# Patient Record
Sex: MALE | Race: WHITE
[De-identification: names, ages, dates, MRNs, and addresses within clinical notes are randomized per-mention and may not be internally consistent; named-entity substitution may affect disease eponyms.]

---

## 2017-12-12 ENCOUNTER — HOSPITAL ENCOUNTER (INPATIENT)
Dept: HOSPITAL 35 - ER | Age: 62
LOS: 1 days | Discharge: HOME | DRG: 69 | End: 2017-12-13
Attending: FAMILY MEDICINE | Admitting: FAMILY MEDICINE
Payer: COMMERCIAL

## 2017-12-12 VITALS — HEIGHT: 72.01 IN | BODY MASS INDEX: 26.9 KG/M2 | WEIGHT: 198.6 LBS

## 2017-12-12 VITALS — SYSTOLIC BLOOD PRESSURE: 172 MMHG | DIASTOLIC BLOOD PRESSURE: 82 MMHG

## 2017-12-12 VITALS — SYSTOLIC BLOOD PRESSURE: 168 MMHG | DIASTOLIC BLOOD PRESSURE: 98 MMHG

## 2017-12-12 VITALS — SYSTOLIC BLOOD PRESSURE: 144 MMHG | DIASTOLIC BLOOD PRESSURE: 77 MMHG

## 2017-12-12 VITALS — DIASTOLIC BLOOD PRESSURE: 85 MMHG | SYSTOLIC BLOOD PRESSURE: 139 MMHG

## 2017-12-12 VITALS — DIASTOLIC BLOOD PRESSURE: 74 MMHG | SYSTOLIC BLOOD PRESSURE: 143 MMHG

## 2017-12-12 VITALS — DIASTOLIC BLOOD PRESSURE: 78 MMHG | SYSTOLIC BLOOD PRESSURE: 135 MMHG

## 2017-12-12 DIAGNOSIS — Z91.040: ICD-10-CM

## 2017-12-12 DIAGNOSIS — Z79.899: ICD-10-CM

## 2017-12-12 DIAGNOSIS — G45.9: Primary | ICD-10-CM

## 2017-12-12 DIAGNOSIS — Z95.820: ICD-10-CM

## 2017-12-12 DIAGNOSIS — Z95.5: ICD-10-CM

## 2017-12-12 DIAGNOSIS — R07.9: ICD-10-CM

## 2017-12-12 DIAGNOSIS — I10: ICD-10-CM

## 2017-12-12 DIAGNOSIS — M47.892: ICD-10-CM

## 2017-12-12 DIAGNOSIS — I25.10: ICD-10-CM

## 2017-12-12 DIAGNOSIS — D72.829: ICD-10-CM

## 2017-12-12 DIAGNOSIS — I73.9: ICD-10-CM

## 2017-12-12 DIAGNOSIS — F41.0: ICD-10-CM

## 2017-12-12 DIAGNOSIS — E78.5: ICD-10-CM

## 2017-12-12 DIAGNOSIS — R20.2: ICD-10-CM

## 2017-12-12 DIAGNOSIS — F10.10: ICD-10-CM

## 2017-12-12 DIAGNOSIS — H40.9: ICD-10-CM

## 2017-12-12 DIAGNOSIS — F17.210: ICD-10-CM

## 2017-12-12 DIAGNOSIS — Z79.82: ICD-10-CM

## 2017-12-12 LAB
ANION GAP SERPL CALC-SCNC: 10 MMOL/L (ref 7–16)
ANISOCYTOSIS BLD QL SMEAR: SLIGHT
BILIRUB UR-MCNC: NEGATIVE MG/DL
BUN SERPL-MCNC: 20 MG/DL (ref 7–18)
CALCIUM SERPL-MCNC: 8.9 MG/DL (ref 8.5–10.1)
CHLORIDE SERPL-SCNC: 104 MMOL/L (ref 98–107)
CO2 SERPL-SCNC: 24 MMOL/L (ref 21–32)
COLOR UR: YELLOW
CREAT SERPL-MCNC: 1.3 MG/DL (ref 0.7–1.3)
ERYTHROCYTE [DISTWIDTH] IN BLOOD BY AUTOMATED COUNT: 12.7 % (ref 10.5–14.5)
GLUCOSE SERPL-MCNC: 142 MG/DL (ref 74–106)
GRANULOCYTES NFR BLD MANUAL: 87 % (ref 36–66)
HCT VFR BLD CALC: 44.6 % (ref 42–52)
HGB BLD-MCNC: 15.6 GM/DL (ref 14–18)
KETONES UR STRIP-MCNC: NEGATIVE MG/DL
LYMPHOCYTES NFR BLD AUTO: 4 % (ref 24–44)
MANUAL DIFFERENTIAL PERFORMED BLD QL: YES
MCH RBC QN AUTO: 32.8 PG (ref 26–34)
MCHC RBC AUTO-ENTMCNC: 35.1 G/DL (ref 28–37)
MCV RBC: 93.5 FL (ref 80–100)
MONOCYTES NFR BLD: 3 % (ref 1–8)
NEUTROPHILS # BLD: 15.3 THOU/UL (ref 1.4–8.2)
NEUTS BAND NFR BLD: 6 % (ref 0–8)
NITRITE UR QL STRIP: NEGATIVE
PLATELET # BLD EST: NORMAL 10*3/UL
PLATELET # BLD: 190 THOU/UL (ref 150–400)
POTASSIUM SERPL-SCNC: 4.3 MMOL/L (ref 3.5–5.1)
RBC # BLD AUTO: 4.77 MIL/UL (ref 4.5–6)
RBC # UR STRIP: NEGATIVE /UL
SODIUM SERPL-SCNC: 138 MMOL/L (ref 136–145)
SP GR UR STRIP: <= 1.005 (ref 1–1.03)
TOTAL CELL COUNT: 100
TROPONIN I SERPL-MCNC: < 0.04 NG/ML (ref ?–0.06)
URINE GLUCOSE-RANDOM*: NEGATIVE
URINE PROTEIN (DIPSTICK): NEGATIVE
UROBILINOGEN UR STRIP-ACNC: 0.2 E.U./DL (ref 0.2–1)
WBC # BLD AUTO: 16.5 THOU/UL (ref 4–11)

## 2017-12-12 PROCEDURE — 10779: CPT

## 2017-12-13 VITALS — DIASTOLIC BLOOD PRESSURE: 72 MMHG | SYSTOLIC BLOOD PRESSURE: 136 MMHG

## 2017-12-13 VITALS — SYSTOLIC BLOOD PRESSURE: 146 MMHG | DIASTOLIC BLOOD PRESSURE: 81 MMHG

## 2017-12-13 VITALS — DIASTOLIC BLOOD PRESSURE: 80 MMHG | SYSTOLIC BLOOD PRESSURE: 145 MMHG

## 2017-12-13 LAB
ANION GAP SERPL CALC-SCNC: 9 MMOL/L (ref 7–16)
BASOPHILS NFR BLD AUTO: 0.6 % (ref 0–2)
BILIRUB UR-MCNC: NEGATIVE MG/DL
BUN SERPL-MCNC: 17 MG/DL (ref 7–18)
CALCIUM SERPL-MCNC: 8.5 MG/DL (ref 8.5–10.1)
CHLORIDE SERPL-SCNC: 107 MMOL/L (ref 98–107)
CO2 SERPL-SCNC: 26 MMOL/L (ref 21–32)
COLOR UR: YELLOW
CREAT SERPL-MCNC: 1.1 MG/DL (ref 0.7–1.3)
EOSINOPHIL NFR BLD: 4.7 % (ref 0–3)
ERYTHROCYTE [DISTWIDTH] IN BLOOD BY AUTOMATED COUNT: 12.5 % (ref 10.5–14.5)
GLUCOSE SERPL-MCNC: 114 MG/DL (ref 74–106)
GRANULOCYTES NFR BLD MANUAL: 59.2 % (ref 36–66)
HCT VFR BLD CALC: 42 % (ref 42–52)
HGB BLD-MCNC: 14.4 GM/DL (ref 14–18)
KETONES UR STRIP-MCNC: NEGATIVE MG/DL
LYMPHOCYTES NFR BLD AUTO: 28.3 % (ref 24–44)
MANUAL DIFFERENTIAL PERFORMED BLD QL: NO
MCH RBC QN AUTO: 32.5 PG (ref 26–34)
MCHC RBC AUTO-ENTMCNC: 34.3 G/DL (ref 28–37)
MCV RBC: 94.7 FL (ref 80–100)
MONOCYTES NFR BLD: 7.2 % (ref 1–8)
NEUTROPHILS # BLD: 5.8 THOU/UL (ref 1.4–8.2)
PLATELET # BLD: 177 THOU/UL (ref 150–400)
POTASSIUM SERPL-SCNC: 4 MMOL/L (ref 3.5–5.1)
RBC # BLD AUTO: 4.44 MIL/UL (ref 4.5–6)
RBC # UR STRIP: NEGATIVE /UL
SODIUM SERPL-SCNC: 142 MMOL/L (ref 136–145)
SP GR UR STRIP: 1.01 (ref 1–1.03)
TROPONIN I SERPL-MCNC: < 0.04 NG/ML (ref ?–0.06)
URINE GLUCOSE-RANDOM*: NEGATIVE
URINE LEUKOCYTES-REFLEX: NEGATIVE
URINE PROTEIN (DIPSTICK): NEGATIVE
UROBILINOGEN UR STRIP-ACNC: 0.2 E.U./DL (ref 0.2–1)
WBC # BLD AUTO: 9.7 THOU/UL (ref 4–11)

## 2017-12-13 PROCEDURE — B24BZZ4 ULTRASONOGRAPHY OF HEART WITH AORTA, TRANSESOPHAGEAL: ICD-10-PCS | Performed by: INTERNAL MEDICINE

## 2017-12-15 ENCOUNTER — HOSPITAL ENCOUNTER (OUTPATIENT)
Dept: HOSPITAL 61 - PCVCIMAG | Age: 62
Discharge: HOME | End: 2017-12-15
Attending: INTERNAL MEDICINE
Payer: COMMERCIAL

## 2017-12-15 DIAGNOSIS — I71.4: ICD-10-CM

## 2017-12-15 DIAGNOSIS — I25.10: Primary | ICD-10-CM

## 2017-12-15 DIAGNOSIS — Z95.5: ICD-10-CM

## 2017-12-15 DIAGNOSIS — I10: ICD-10-CM

## 2017-12-15 PROCEDURE — 93017 CV STRESS TEST TRACING ONLY: CPT

## 2017-12-15 NOTE — PCVCIMAG
--------------- APPROVED REPORT --------------





Patient Location: Echo lab-TREADMILL STRESS TEST

Room #: 



Stress Nurse: Bertha Shelton RN



INDICATIONS:  Chest tightness and tingling in the fingers



HISTORY:  CAD, S/P Stent



The patient exercised according to the Álvaro Protocol for 10:16 

minutes, achieving a maximum work level of 13.4 METS.  

The resting heart rate of 85 bpm, tayler to a maximal level of 157 bpm. 

This value represents 100% of the maximal, age-predicted heart rate.  

The resting blood pressure of 172/82  mmHg, tayler to a maximum blood 

pressure of 210/90 mmHg.  

The exercise was stopped due to dyspnea and fatigue.





 Conclusion

1. Maximal treadmill stress negative for ischemia

2.  This study was associated with good exercise capacity (13.4 

METS)

## 2017-12-21 ENCOUNTER — HOSPITAL ENCOUNTER (OUTPATIENT)
Dept: HOSPITAL 61 - PCVCIMAG | Age: 62
Discharge: HOME | End: 2017-12-21
Attending: INTERNAL MEDICINE
Payer: COMMERCIAL

## 2017-12-21 DIAGNOSIS — I77.811: Primary | ICD-10-CM

## 2017-12-21 DIAGNOSIS — I73.9: ICD-10-CM

## 2017-12-21 PROCEDURE — 93978 VASCULAR STUDY: CPT

## 2017-12-21 NOTE — PCVCIMAG
EXAM: AORTOILIAC DUPLEX



INDICATION: Peripheral arterial disease 



FINDINGS: 



AORTA: Suprarenal aorta measures maximum diameter of 2.8 cm. There is

not a fusiform infrarenal aortic aneurysm. The infrarenal aorta

measures maximum diameter of 2.6 cm. No aortic stenosis.



RIGHT COMMON ILIAC ARTERY: Maximum diameter is 1.0 cm. No significant

stenosis.



RIGHT EXTERNAL ILIAC ARTERY:  No significant stenosis.



LEFT COMMON ILIAC ARTERY: Maximum diameter is 1.0 cm.  No significant

stenosis.



LEFT EXTERNAL ILIAC ARTERY:  No significant stenosis.



IMPRESSION: 

No abdominal aortic aneurysm. No aortoiliac stenosis seen.

Mild ectasia infrarenal abdominal aorta.





LOC:[id number]

## 2018-10-31 ENCOUNTER — HOSPITAL ENCOUNTER (EMERGENCY)
Dept: HOSPITAL 35 - ER | Age: 63
Discharge: HOME | End: 2018-10-31
Payer: COMMERCIAL

## 2018-10-31 VITALS — SYSTOLIC BLOOD PRESSURE: 163 MMHG | DIASTOLIC BLOOD PRESSURE: 86 MMHG

## 2018-10-31 VITALS — WEIGHT: 194.01 LBS | HEIGHT: 72 IN | BODY MASS INDEX: 26.28 KG/M2
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
## 2018-10-31 DIAGNOSIS — S61.011A: Primary | ICD-10-CM

## 2018-10-31 DIAGNOSIS — E78.5: ICD-10-CM

## 2018-10-31 DIAGNOSIS — I10: ICD-10-CM
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
## 2018-10-31 DIAGNOSIS — Z87.891: ICD-10-CM

## 2018-10-31 DIAGNOSIS — Z91.040: ICD-10-CM
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
## 2019-08-05 ENCOUNTER — HOSPITAL ENCOUNTER (OUTPATIENT)
Dept: HOSPITAL 61 - PCVCIMAG | Age: 64
Discharge: HOME | End: 2019-08-05
Attending: INTERNAL MEDICINE
Payer: MEDICARE

## 2019-08-05 DIAGNOSIS — I25.10: ICD-10-CM

## 2019-08-05 DIAGNOSIS — I71.4: ICD-10-CM

## 2019-08-05 DIAGNOSIS — I65.23: Primary | ICD-10-CM

## 2019-08-05 DIAGNOSIS — Z87.891: ICD-10-CM

## 2019-08-05 DIAGNOSIS — E78.5: ICD-10-CM

## 2019-08-05 PROCEDURE — 93005 ELECTROCARDIOGRAM TRACING: CPT

## 2019-08-05 PROCEDURE — G0463 HOSPITAL OUTPT CLINIC VISIT: HCPCS

## 2019-08-05 PROCEDURE — 93880 EXTRACRANIAL BILAT STUDY: CPT

## 2019-08-05 PROCEDURE — 93978 VASCULAR STUDY: CPT

## 2019-08-05 NOTE — PCVCIMAG
EXAM: AORTOILIAC DUPLEX



INDICATION: Abdominal aortic aneurysm with previous open surgical

repair. 



FINDINGS: 



AORTA: Suprarenal aorta measures maximum diameter of 3.0 cm. There is

not a fusiform infrarenal aortic aneurysm. The infrarenal aorta

measures maximum diameter of 2.7 cm. No aortic stenosis.



RIGHT COMMON ILIAC ARTERY: Maximum diameter is 1.4 cm. No significant

stenosis.



RIGHT EXTERNAL ILIAC ARTERY:  No significant stenosis.



LEFT COMMON ILIAC ARTERY: Maximum diameter is 1.4 cm.  No significant

stenosis.



LEFT EXTERNAL ILIAC ARTERY:  No significant stenosis.



IMPRESSION: 

No abdominal aortic aneurysm. No aortoiliac stenosis seen.

By report there has been previous open surgical repair of a previous

abdominal aortic aneurysm.





LOC:[id number]

## 2019-08-05 NOTE — PCVCIMAG
--------------- APPROVED REPORT 

--------------





Indications

Stenosis

History of Smoking



Doppler Spectral Velocity Analysis

PSV  /  EDVPSV  /  EDV

ECA (R)     87 / 17 cm/sECA (L)     221 / 40 cm/s



dICA (R)    46 / 18 cm/sdICA (L)     59 / 21 cm/s

Amina (R)     57 / 23 cm/smICA (L)     68 / 25 cm/s

pICA (R)     55 / 18 cm/spICA (L)     53 / 21 cm/s



Bulb (R)        67 / 16 cm/sBulb (L)         49 / 16 cm/s



dCCA (R)     81 / 22 cm/sdCCA (L)     60 / 18 cm/s

mCCA (R)    83 / 19 cm/smCCA (L)    72 / 20 cm/s



Vert (R)     56 / 18 cm/sVert (L)     50 / 12 cm/s

ICA/CCA     0.70ICA/CCA     1.13



Basic Measurements

Blood Pressure:                                                 

Pulses:                       

                                Right           Left               

             RightLeft

Brachial(Sitting)               146/24vrFj917/90mmHgTemporal     





Real Time B-Mode Imaging

Vert. (R)AntegradeVert. (L)Antegrade



Findings

The right carotid bulb has moderate calcified plaque.

The right proximal internal carotid artery shows <40% 

stenosis.

The right common carotid artery shows no significant stenosis.

The right external carotid artery shows no significant stenosis.

The left carotid bulb has moderate calcified plaque.

The left proximal internal carotid artery shows <40% stenosis.

The left common carotid artery shows no significant stenosis.

The left external carotid artery shows >50% 

stenosis.



Conclusion

1. Right internal carotid artery stenosis (<40%)

2.  Left internal carotid artery stenosis (<40%)

3.  Antegrade vertebral flow

## 2020-01-06 ENCOUNTER — HOSPITAL ENCOUNTER (OUTPATIENT)
Dept: HOSPITAL 35 - SJCVC | Age: 65
End: 2020-01-06
Attending: INTERNAL MEDICINE
Payer: COMMERCIAL

## 2020-01-06 DIAGNOSIS — I25.10: Primary | ICD-10-CM

## 2020-01-06 DIAGNOSIS — Z87.891: ICD-10-CM

## 2020-01-06 DIAGNOSIS — I65.23: ICD-10-CM

## 2020-01-06 DIAGNOSIS — Z79.899: ICD-10-CM

## 2020-01-06 DIAGNOSIS — Z79.82: ICD-10-CM

## 2020-01-06 DIAGNOSIS — I71.4: ICD-10-CM

## 2020-01-06 DIAGNOSIS — E78.5: ICD-10-CM

## 2020-01-06 DIAGNOSIS — I48.3: ICD-10-CM

## 2020-02-02 ENCOUNTER — HOSPITAL ENCOUNTER (EMERGENCY)
Dept: HOSPITAL 35 - ER | Age: 65
Discharge: HOME | End: 2020-02-02
Payer: COMMERCIAL

## 2020-02-02 VITALS — DIASTOLIC BLOOD PRESSURE: 74 MMHG | SYSTOLIC BLOOD PRESSURE: 117 MMHG

## 2020-02-02 VITALS — WEIGHT: 200 LBS | HEIGHT: 72 IN | BODY MASS INDEX: 27.09 KG/M2

## 2020-02-02 DIAGNOSIS — E78.5: ICD-10-CM

## 2020-02-02 DIAGNOSIS — Z87.891: ICD-10-CM

## 2020-02-02 DIAGNOSIS — Z91.040: ICD-10-CM

## 2020-02-02 DIAGNOSIS — I48.92: Primary | ICD-10-CM

## 2020-02-02 DIAGNOSIS — I10: ICD-10-CM

## 2020-02-02 LAB
ANION GAP SERPL CALC-SCNC: 10 MMOL/L (ref 7–16)
BASOPHILS NFR BLD AUTO: 0.3 % (ref 0–2)
BUN SERPL-MCNC: 13 MG/DL (ref 7–18)
CALCIUM SERPL-MCNC: 9.2 MG/DL (ref 8.5–10.1)
CHLORIDE SERPL-SCNC: 101 MMOL/L (ref 98–107)
CO2 SERPL-SCNC: 25 MMOL/L (ref 21–32)
CREAT SERPL-MCNC: 1.1 MG/DL (ref 0.7–1.3)
EOSINOPHIL NFR BLD: 0.7 % (ref 0–3)
ERYTHROCYTE [DISTWIDTH] IN BLOOD BY AUTOMATED COUNT: 12.7 % (ref 10.5–14.5)
GLUCOSE SERPL-MCNC: 108 MG/DL (ref 74–106)
GRANULOCYTES NFR BLD MANUAL: 78 % (ref 36–66)
HCT VFR BLD CALC: 43.7 % (ref 42–52)
HGB BLD-MCNC: 14.8 GM/DL (ref 14–18)
LYMPHOCYTES NFR BLD AUTO: 14.1 % (ref 24–44)
MAGNESIUM SERPL-MCNC: 2 MG/DL (ref 1.8–2.4)
MCH RBC QN AUTO: 32.1 PG (ref 26–34)
MCHC RBC AUTO-ENTMCNC: 33.9 G/DL (ref 28–37)
MCV RBC: 94.8 FL (ref 80–100)
MONOCYTES NFR BLD: 6.9 % (ref 1–8)
NEUTROPHILS # BLD: 10.3 THOU/UL (ref 1.4–8.2)
PLATELET # BLD: 216 THOU/UL (ref 150–400)
POTASSIUM SERPL-SCNC: 4.2 MMOL/L (ref 3.5–5.1)
RBC # BLD AUTO: 4.6 MIL/UL (ref 4.5–6)
SODIUM SERPL-SCNC: 136 MMOL/L (ref 136–145)
TROPONIN I SERPL-MCNC: <0.06 NG/ML (ref ?–0.06)
WBC # BLD AUTO: 13.2 THOU/UL (ref 4–11)

## 2020-02-06 ENCOUNTER — HOSPITAL ENCOUNTER (OUTPATIENT)
Dept: HOSPITAL 35 - SJCVCIMAG | Age: 65
End: 2020-02-06
Attending: INTERNAL MEDICINE
Payer: COMMERCIAL

## 2020-02-06 DIAGNOSIS — E78.00: ICD-10-CM

## 2020-02-06 DIAGNOSIS — I25.10: Primary | ICD-10-CM

## 2020-02-06 DIAGNOSIS — Z87.891: ICD-10-CM

## 2020-02-06 DIAGNOSIS — I10: ICD-10-CM

## 2020-02-06 DIAGNOSIS — Z79.899: ICD-10-CM

## 2020-02-06 DIAGNOSIS — Z79.82: ICD-10-CM

## 2020-02-12 ENCOUNTER — HOSPITAL ENCOUNTER (OUTPATIENT)
Dept: HOSPITAL 35 - CATH | Age: 65
LOS: 1 days | Discharge: HOME | End: 2020-02-13
Attending: INTERNAL MEDICINE
Payer: COMMERCIAL

## 2020-02-12 VITALS — BODY MASS INDEX: 26.82 KG/M2 | WEIGHT: 198 LBS | HEIGHT: 72.01 IN

## 2020-02-12 VITALS — DIASTOLIC BLOOD PRESSURE: 67 MMHG | SYSTOLIC BLOOD PRESSURE: 126 MMHG

## 2020-02-12 VITALS — DIASTOLIC BLOOD PRESSURE: 73 MMHG | SYSTOLIC BLOOD PRESSURE: 127 MMHG

## 2020-02-12 VITALS — DIASTOLIC BLOOD PRESSURE: 77 MMHG | SYSTOLIC BLOOD PRESSURE: 132 MMHG

## 2020-02-12 VITALS — DIASTOLIC BLOOD PRESSURE: 78 MMHG | SYSTOLIC BLOOD PRESSURE: 127 MMHG

## 2020-02-12 VITALS — SYSTOLIC BLOOD PRESSURE: 116 MMHG | DIASTOLIC BLOOD PRESSURE: 84 MMHG

## 2020-02-12 VITALS — SYSTOLIC BLOOD PRESSURE: 125 MMHG | DIASTOLIC BLOOD PRESSURE: 68 MMHG

## 2020-02-12 VITALS — DIASTOLIC BLOOD PRESSURE: 67 MMHG | SYSTOLIC BLOOD PRESSURE: 119 MMHG

## 2020-02-12 VITALS — SYSTOLIC BLOOD PRESSURE: 121 MMHG | DIASTOLIC BLOOD PRESSURE: 72 MMHG

## 2020-02-12 VITALS — SYSTOLIC BLOOD PRESSURE: 138 MMHG | DIASTOLIC BLOOD PRESSURE: 72 MMHG

## 2020-02-12 VITALS — SYSTOLIC BLOOD PRESSURE: 115 MMHG | DIASTOLIC BLOOD PRESSURE: 68 MMHG

## 2020-02-12 VITALS — SYSTOLIC BLOOD PRESSURE: 114 MMHG | DIASTOLIC BLOOD PRESSURE: 72 MMHG

## 2020-02-12 VITALS — DIASTOLIC BLOOD PRESSURE: 75 MMHG | SYSTOLIC BLOOD PRESSURE: 118 MMHG

## 2020-02-12 DIAGNOSIS — I10: ICD-10-CM

## 2020-02-12 DIAGNOSIS — I47.1: Primary | ICD-10-CM

## 2020-02-12 DIAGNOSIS — Z79.01: ICD-10-CM

## 2020-02-12 DIAGNOSIS — I48.91: ICD-10-CM

## 2020-02-12 DIAGNOSIS — K21.9: ICD-10-CM

## 2020-02-12 DIAGNOSIS — Z91.040: ICD-10-CM

## 2020-02-12 DIAGNOSIS — Z79.899: ICD-10-CM

## 2020-02-12 DIAGNOSIS — I48.3: ICD-10-CM

## 2020-02-12 DIAGNOSIS — H40.9: ICD-10-CM

## 2020-02-12 DIAGNOSIS — Z98.890: ICD-10-CM

## 2020-02-12 DIAGNOSIS — Z87.891: ICD-10-CM

## 2020-02-12 DIAGNOSIS — E78.5: ICD-10-CM

## 2020-02-12 LAB
ANION GAP SERPL CALC-SCNC: 7 MMOL/L (ref 7–16)
BASOPHILS NFR BLD AUTO: 0.3 % (ref 0–2)
BUN SERPL-MCNC: 16 MG/DL (ref 7–18)
CALCIUM SERPL-MCNC: 8.8 MG/DL (ref 8.5–10.1)
CHLORIDE SERPL-SCNC: 102 MMOL/L (ref 98–107)
CO2 SERPL-SCNC: 29 MMOL/L (ref 21–32)
CREAT SERPL-MCNC: 1.1 MG/DL (ref 0.7–1.3)
EOSINOPHIL NFR BLD: 3.7 % (ref 0–3)
ERYTHROCYTE [DISTWIDTH] IN BLOOD BY AUTOMATED COUNT: 12.5 % (ref 10.5–14.5)
GLUCOSE SERPL-MCNC: 146 MG/DL (ref 74–106)
GRANULOCYTES NFR BLD MANUAL: 62.1 % (ref 36–66)
HCT VFR BLD CALC: 41 % (ref 42–52)
HGB BLD-MCNC: 14.1 GM/DL (ref 14–18)
INR PPP: 1.1
LYMPHOCYTES NFR BLD AUTO: 25.3 % (ref 24–44)
MCH RBC QN AUTO: 32.4 PG (ref 26–34)
MCHC RBC AUTO-ENTMCNC: 34.3 G/DL (ref 28–37)
MCV RBC: 94.5 FL (ref 80–100)
MONOCYTES NFR BLD: 8.6 % (ref 1–8)
NEUTROPHILS # BLD: 3.9 THOU/UL (ref 1.4–8.2)
PLATELET # BLD: 181 THOU/UL (ref 150–400)
POTASSIUM SERPL-SCNC: 4.2 MMOL/L (ref 3.5–5.1)
PROTHROMBIN TIME: 11 SECONDS (ref 9.3–11.4)
RBC # BLD AUTO: 4.33 MIL/UL (ref 4.5–6)
SODIUM SERPL-SCNC: 138 MMOL/L (ref 136–145)
WBC # BLD AUTO: 6.3 THOU/UL (ref 4–11)

## 2020-02-12 PROCEDURE — 62110: CPT

## 2020-02-12 PROCEDURE — 62900: CPT

## 2020-02-12 PROCEDURE — 10081 I&D PILONIDAL CYST COMP: CPT

## 2020-02-12 PROCEDURE — 70005: CPT

## 2020-02-12 NOTE — EKG
[facility]
[address]
[place]   [address]                     ELECTROCARDIOGRAM REPORT      
_______________________________________________________________________________
 
Name:       CHATA RAHMAN              [facility]                     [id number]                       Account #:      [id number]  
Admission:  20    Attend Phys:                          
Discharge:  20    Date of Birth:  [birth year]  
                                                          Report #: [id number]
                                                                    [id number]
_______________________________________________________________________________
THIS REPORT FOR:  
 
cc:  Shaun Rodríguez MD, Eric K. MD Lundgren,Elvin SMITH MD [facility]                                        ~
THIS REPORT FOR:   //name//                          
 
                         [facility] ED
                                       
Test Date:    2020               Test Time:    14:08:12
Pat Name:     CHATA RAHMAN           Department:   
Patient ID:   [id number]            Room:          
Gender:                               Technician:   [facility]
:          1955               Requested By: Ariella Watkins
Order Number: [id number] MD:   Elvin Shi
                                 Measurements
Intervals                              Axis          
Rate:         154                      P:            0
MT:                                    QRS:          -12
QRSD:         92                       T:            29
QT:           276                                    
QTc:          442                                    
                           Interpretive Statements
Possible atrial flutter with 2:1 AV conduction
Abnormal R-wave progression, early transition
Compared to ECG 2017 14:29:11
Sinus rhythm no longer present
 
Electronically Signed On 2-3-2020 17:21:04 CST by Elvin Shi
[email or web address]
 
 
 
 
 
 
 
 
 
 
 
 
 
 
  <ELECTRONICALLY SIGNED>
   By: Elvin Shi MD, [facility]   
  20     1721
D: 20 1408                           _____________________________________
T: 20 1408                           Elvin Shi MD, [facility]     /EPI

## 2020-02-13 VITALS — SYSTOLIC BLOOD PRESSURE: 132 MMHG | DIASTOLIC BLOOD PRESSURE: 64 MMHG

## 2020-02-13 VITALS — DIASTOLIC BLOOD PRESSURE: 68 MMHG | SYSTOLIC BLOOD PRESSURE: 126 MMHG

## 2020-02-13 VITALS — DIASTOLIC BLOOD PRESSURE: 64 MMHG | SYSTOLIC BLOOD PRESSURE: 132 MMHG

## 2020-05-05 ENCOUNTER — HOSPITAL ENCOUNTER (OUTPATIENT)
Dept: HOSPITAL 35 - CAT | Age: 65
End: 2020-05-05
Attending: INTERNAL MEDICINE
Payer: COMMERCIAL

## 2020-05-05 DIAGNOSIS — I48.0: Primary | ICD-10-CM

## 2020-05-05 LAB
ALBUMIN SERPL-MCNC: 4.4 G/DL (ref 3.4–5)
ALT SERPL-CCNC: 47 U/L (ref 30–65)
ANION GAP SERPL CALC-SCNC: 6 MMOL/L (ref 7–16)
AST SERPL-CCNC: 27 U/L (ref 15–37)
BILIRUB SERPL-MCNC: 0.5 MG/DL
BUN SERPL-MCNC: 16 MG/DL (ref 7–18)
CALCIUM SERPL-MCNC: 8.5 MG/DL (ref 8.5–10.1)
CHLORIDE SERPL-SCNC: 101 MMOL/L (ref 98–107)
CO2 SERPL-SCNC: 28 MMOL/L (ref 21–32)
CREAT SERPL-MCNC: 1.2 MG/DL (ref 0.7–1.3)
ERYTHROCYTE [DISTWIDTH] IN BLOOD BY AUTOMATED COUNT: 13 % (ref 10.5–14.5)
GLUCOSE SERPL-MCNC: 153 MG/DL (ref 74–106)
HCT VFR BLD CALC: 45.6 % (ref 42–52)
HGB BLD-MCNC: 15.5 GM/DL (ref 14–18)
MCH RBC QN AUTO: 32.2 PG (ref 26–34)
MCHC RBC AUTO-ENTMCNC: 33.9 G/DL (ref 28–37)
MCV RBC: 94.8 FL (ref 80–100)
PLATELET # BLD: 200 THOU/UL (ref 150–400)
POTASSIUM SERPL-SCNC: 5.1 MMOL/L (ref 3.5–5.1)
PROT SERPL-MCNC: 7.5 G/DL (ref 6.4–8.2)
RBC # BLD AUTO: 4.81 MIL/UL (ref 4.5–6)
SODIUM SERPL-SCNC: 135 MMOL/L (ref 136–145)
WBC # BLD AUTO: 6.6 THOU/UL (ref 4–11)

## 2020-05-22 ENCOUNTER — HOSPITAL ENCOUNTER (INPATIENT)
Dept: HOSPITAL 35 - CATH | Age: 65
LOS: 1 days | Discharge: HOME | DRG: 274 | End: 2020-05-23
Attending: INTERNAL MEDICINE | Admitting: INTERNAL MEDICINE
Payer: COMMERCIAL

## 2020-05-22 VITALS — DIASTOLIC BLOOD PRESSURE: 76 MMHG | SYSTOLIC BLOOD PRESSURE: 141 MMHG

## 2020-05-22 VITALS — DIASTOLIC BLOOD PRESSURE: 71 MMHG | SYSTOLIC BLOOD PRESSURE: 122 MMHG

## 2020-05-22 VITALS — SYSTOLIC BLOOD PRESSURE: 128 MMHG | DIASTOLIC BLOOD PRESSURE: 68 MMHG

## 2020-05-22 VITALS — DIASTOLIC BLOOD PRESSURE: 73 MMHG | SYSTOLIC BLOOD PRESSURE: 126 MMHG

## 2020-05-22 VITALS — SYSTOLIC BLOOD PRESSURE: 132 MMHG | DIASTOLIC BLOOD PRESSURE: 69 MMHG

## 2020-05-22 VITALS — DIASTOLIC BLOOD PRESSURE: 70 MMHG | SYSTOLIC BLOOD PRESSURE: 138 MMHG

## 2020-05-22 VITALS — SYSTOLIC BLOOD PRESSURE: 129 MMHG | DIASTOLIC BLOOD PRESSURE: 65 MMHG

## 2020-05-22 VITALS — DIASTOLIC BLOOD PRESSURE: 61 MMHG | SYSTOLIC BLOOD PRESSURE: 128 MMHG

## 2020-05-22 VITALS — SYSTOLIC BLOOD PRESSURE: 100 MMHG | DIASTOLIC BLOOD PRESSURE: 64 MMHG

## 2020-05-22 VITALS — DIASTOLIC BLOOD PRESSURE: 66 MMHG | SYSTOLIC BLOOD PRESSURE: 132 MMHG

## 2020-05-22 VITALS — WEIGHT: 208 LBS | BODY MASS INDEX: 28.17 KG/M2 | HEIGHT: 72.01 IN

## 2020-05-22 DIAGNOSIS — I48.91: Primary | ICD-10-CM

## 2020-05-22 DIAGNOSIS — I25.10: ICD-10-CM

## 2020-05-22 DIAGNOSIS — I48.92: ICD-10-CM

## 2020-05-22 DIAGNOSIS — Z79.899: ICD-10-CM

## 2020-05-22 DIAGNOSIS — Z20.828: ICD-10-CM

## 2020-05-22 DIAGNOSIS — I73.9: ICD-10-CM

## 2020-05-22 LAB
ALBUMIN SERPL-MCNC: 4 G/DL (ref 3.4–5)
ALT SERPL-CCNC: 62 U/L (ref 30–65)
ANION GAP SERPL CALC-SCNC: 5 MMOL/L (ref 7–16)
AST SERPL-CCNC: 36 U/L (ref 15–37)
BASOPHILS NFR BLD AUTO: 0.7 % (ref 0–2)
BILIRUB SERPL-MCNC: 0.5 MG/DL
BUN SERPL-MCNC: 16 MG/DL (ref 7–18)
CALCIUM SERPL-MCNC: 8.4 MG/DL (ref 8.5–10.1)
CHLORIDE SERPL-SCNC: 103 MMOL/L (ref 98–107)
CO2 SERPL-SCNC: 29 MMOL/L (ref 21–32)
CREAT SERPL-MCNC: 1.1 MG/DL (ref 0.7–1.3)
EOSINOPHIL NFR BLD: 3.7 % (ref 0–3)
ERYTHROCYTE [DISTWIDTH] IN BLOOD BY AUTOMATED COUNT: 13 % (ref 10.5–14.5)
GLUCOSE SERPL-MCNC: 152 MG/DL (ref 74–106)
GRANULOCYTES NFR BLD MANUAL: 66.6 % (ref 36–66)
HCT VFR BLD CALC: 44.3 % (ref 42–52)
HGB BLD-MCNC: 15.4 GM/DL (ref 14–18)
INR PPP: 1.2
LYMPHOCYTES NFR BLD AUTO: 21.9 % (ref 24–44)
MCH RBC QN AUTO: 32.9 PG (ref 26–34)
MCHC RBC AUTO-ENTMCNC: 34.7 G/DL (ref 28–37)
MCV RBC: 94.8 FL (ref 80–100)
MONOCYTES NFR BLD: 7.1 % (ref 1–8)
NEUTROPHILS # BLD: 4.9 THOU/UL (ref 1.4–8.2)
PLATELET # BLD: 192 THOU/UL (ref 150–400)
POTASSIUM SERPL-SCNC: 4.3 MMOL/L (ref 3.5–5.1)
PROT SERPL-MCNC: 7.2 G/DL (ref 6.4–8.2)
PROTHROMBIN TIME: 11.8 SECONDS (ref 9.3–11.4)
RBC # BLD AUTO: 4.67 MIL/UL (ref 4.5–6)
SODIUM SERPL-SCNC: 137 MMOL/L (ref 136–145)
WBC # BLD AUTO: 7.4 THOU/UL (ref 4–11)

## 2020-05-22 PROCEDURE — 62900: CPT

## 2020-05-22 PROCEDURE — 02583ZZ DESTRUCTION OF CONDUCTION MECHANISM, PERCUTANEOUS APPROACH: ICD-10-PCS | Performed by: INTERNAL MEDICINE

## 2020-05-22 PROCEDURE — 10797: CPT

## 2020-05-22 PROCEDURE — 70005: CPT

## 2020-05-22 PROCEDURE — 4A0234Z MEASUREMENT OF CARDIAC ELECTRICAL ACTIVITY, PERCUTANEOUS APPROACH: ICD-10-PCS | Performed by: INTERNAL MEDICINE

## 2020-05-22 PROCEDURE — 4A023FZ MEASUREMENT OF CARDIAC RHYTHM, PERCUTANEOUS APPROACH: ICD-10-PCS | Performed by: INTERNAL MEDICINE

## 2020-05-22 PROCEDURE — 02K83ZZ MAP CONDUCTION MECHANISM, PERCUTANEOUS APPROACH: ICD-10-PCS | Performed by: INTERNAL MEDICINE

## 2020-05-22 PROCEDURE — 65020: CPT

## 2020-05-22 PROCEDURE — 62110: CPT

## 2020-05-22 PROCEDURE — 65040: CPT

## 2020-05-22 NOTE — NUR
TO UNIT FROM CATH LAB BY BED @1200. ORIENTED TO UNIT AND FALL PRECAUTIONS IN
PLACE. BEDREST UNTIL 1430. VSS. NSR PER TELE.

## 2020-05-23 VITALS — DIASTOLIC BLOOD PRESSURE: 59 MMHG | SYSTOLIC BLOOD PRESSURE: 117 MMHG

## 2020-05-23 VITALS — SYSTOLIC BLOOD PRESSURE: 116 MMHG | DIASTOLIC BLOOD PRESSURE: 65 MMHG

## 2020-05-23 NOTE — NUR
ASSUMMED PT CARE AT APPROXIMATELY 0700. PT A&O X4. ASSESSMENT AS CHARTED. PT
AMBUALTES STEADY/INDEPENDENTLY. PT DENIES HAVING CHEST PAIN. PT DENIES HAVING
SOB. PT DENIES HAVING ACUTE PAIN. PT DISCHARGING HOME C SELF CARE. PT R GROIN
DRY AND INTACT. DRY BLOOD. NO NEW BLEEDING. NO HEMATOMA. PT RECEIVED DISCHARGE
EDUCATION. PT STATED UNDERSTANDING AND DENIED HAVING FURTHER QUESTIONS. VITAL
SIGNS STABLE. PT COMFORTABLE. PT DENIED HAVING FURTHER CONCERNS. PT WILL
[facility]E HOSPITAL TRANSPORT OFF UNIT. IV DC. TELE DC.

## 2020-05-23 NOTE — NUR
ASSESSMENTS AS CHARTED, MEDS GIVEN AS CHARTED. PATIENT UP AT JAI IN ROOM AFTER
BEDREST  FROM AFIB ABLATION. RIGHT GROIN SITE IS SOFT NON-TENDER,
MARGINS MARKED ON PRESSURE DRESSING PRIOR TO SHIFT CHANGE, NO CHANGES
OBSERVED. RIGHT WRIST ART LINE SITE DRY AND INTACT. DENIES PAIN. FALL
PRECAUTIONS IN PLACE DURING SHIFT. PLAN OF CARE IS TO GO HOME IN AM.

## 2020-05-28 NOTE — D
[facility]
[address]
[place]   [address]                     DISCHARGE SUMMARY             
_______________________________________________________________________________
 
Name:       CHATA RAHMAN              Room #:         207-P       [facility] [place]  
M.R.#:      [id number]                       Account #:      [id number]  
Admission:  05/22/20    Attend Phys:    Sundeep Boswell MD
Discharge:  05/23/20    Date of Birth:  [birth year]  
                                                          Report #: [id number]
                                                                    [id number]   
_______________________________________________________________________________
THIS REPORT FOR:  
 
cc:  Shaun Rodríguez MD, Eric K. MD Couchonnal, Luis F. MD                                        ~
THIS REPORT FOR:   //name//                          
 
CC: Shaun Boswell
 
DATE OF SERVICE:  05/23/2020
 
 
DISCHARGE DIAGNOSES:
1.  Atrial fibrillation.
2.  Atrial flutter.
3.  Coronary artery disease.
4.  Prior abdominal aortic aneurysm, status post repair.
 
PROCEDURES PERFORMED:  AFib ablation.
 
HISTORY:  The patient is a 65-year-old with history of a prior atrial flutter
ablation who is here for an AFib ablation today.  Also, has a history of
coronary artery disease and peripheral vascular disease, status post AAA repair.
 The patient underwent successful AFib ablation with isolation of the pulmonary
veins and creation of a posterior roofline.
 
HOSPITAL COURSE:  The patient was monitored in the CCU overnight.  He was
resumed on his home medications including Multaq and Xarelto therapy.  He did
well overnight with no issues and has an anticipated discharge date of
05/23/2020.  Discharge instructions were reviewed with the patient and his wife.
 
 
 
 
 
 
 
 
 
 
 
 
 
  <ELECTRONICALLY SIGNED>
   By: Sundeep Boswell MD        
  05/28/20     1613
D: 05/22/20 1237                           _____________________________________
T: 05/22/20 1304                           Sundeep Bsowell MD          /nt

## 2020-05-28 NOTE — P
[facility]
[address]
[place]   [address]                     PROCEDURE REPORT              
_______________________________________________________________________________
 
Name:       CHATA RAHMAN              Room #:         207-P       [facility] [place]  
M.R.#:      [id number]                       Account #:      [id number]  
Admission:  05/22/20    Attend Phys:    Sundeep Boswell MD
Discharge:  05/23/20    Date of Birth:  [birth year]  
                                                          Report #: [id number]
                                                                    [id number]   
_______________________________________________________________________________
THIS REPORT FOR:  
 
cc:  Shaun Rodríguez MD, Eric K. MD Couchonnal, Luis F. MD                                        ~
CC: Shaun oBswell
 
ATRIAL FIBRILLATION ABLATION
 
PREOPERATIVE DIAGNOSIS:  Atrial fibrillation and atrial flutter.
 
POSTOPERATIVE DIAGNOSIS:  Atrial fibrillation and atrial flutter.
 
HISTORY OF PRESENT ILLNESS:  The patient is a 65-year-old with recent history of
atrial flutter, status post ablation, found to have atrial fibrillation as well.
 He is here for AFib ablation.
 
ANESTHESIA:  The patient underwent general anesthesia with no anesthesia related
complications.
 
PROCEDURES PERFORMED:
1.  Atrial fibrillation ablation, CPT code 26961.
2.  3D mapping EP, CPT code 35227.
3.  Intracardiac echo, CPT code 58448.
4.  Focal ablation, CPT 88686.
 
DESCRIPTION OF PROCEDURE:  The patient underwent informed consent.  We discussed
the details of the procedure including the risks, which include but not limited
to bleeding, vascular damage, cardiac perforation, stroke and MI.  He understood
these risks and is willing to proceed.
 
The patient was brought to the EP laboratory in a fasting and sedated state,
prepped and draped in a sterile fashion.  I injected lidocaine at the right
groin region, obtained access to the right femoral vein x 3.  I placed an 8, 9
and 7-Ukrainian short sheath using the modified Seldinger technique.  Next, under
fluoroscopy, I placed a decapolar catheter easily in the coronary sinus and ICE
catheter in the right atrium.  The patient had a nice thin interatrial septum,
had some difficulties visualizing the left-sided veins, but I could visualize
the right-sided veins.  Next, the patient was systemically heparinized and
transseptal was performed using an SL1 sheath and a Sylvester needle.  This was
straightforward and I advanced the SL1 sheath into the left atrium with ease and
then exchanged for the cryo sheath.  Via the cryo sheath, I placed a [employer]
Villatoro Lasso catheter and created a 3D voltage map of the left atrium.  Next,
the cryoballoon was placed in the left atrium.  I started by attempting to
isolate the left superior pulmonary vein.  I performed three 4-minute freezes in
 
 
 
[address]   [address]                     PROCEDURE REPORT              
_______________________________________________________________________________
 
Name:       CHATA RAHMAN              Room #:         207-P       [facility] [place]  
M.R.#:      [id number]                       Account #:      [id number]  
Admission:  05/22/20    Attend Phys:    Sundeep Boswell MD
Discharge:  05/23/20    Date of Birth:  [birth year]  
                                                          Report #: [id number]
                                                                    [id number]   
_______________________________________________________________________________
the left superior pulmonary vein and there was some delay in the pulmonary vein
potential, but the vein did not isolate.  I therefore turned my attention to the
left inferior pulmonary vein.  I performed a 4-minute, followed by a 3-minute
freeze.  This vein isolated during the first freeze within 80 seconds.  I then
re-interrogated the left superior pulmonary vein and this was still connected;
therefore, I performed a fourth freeze in this vein and I utilized more
counterclockwise torque and this time performed a venogram and it showed that I
had a nice seal.  This final freeze was of 4 minutes' duration and the vein
isolated within 30 seconds.  I then turned my attention to the right superior
pulmonary vein.  This vein underwent two 4-minute freezes and it isolated during
the second freeze within 56 seconds.  The right inferior pulmonary vein
underwent a single 4-minute freeze as the vein isolated within 41 seconds. 
While freezing the right-sided veins, phrenic nerve pacing was performed and
there was never any phrenic nerve compromise.  Next, I turned my attention to
the roof region.  I performed 3 freezes of the roof anchored from the left
superior pulmonary vein.  I then performed 2 additional freezes anchored from
the right superior pulmonary vein.  Prior to performing my roofline, I did
perform a voltage map which showed wide circumferential ablation of the left
atrium.  After creating my roofline, I lost access to the left atrium and as
such, the procedure was concluded.  Post-ablation, the patient remained in sinus
rhythm.  Intracardiac ultrasound was utilized and verified that there was no
evidence of pericardial effusion.  As such, catheters and sheaths were pulled
and hemostasis was obtained.  The patient awoke neurologically and
hemodynamically intact, no complications and no significant bleeding.
 
CONCLUSIONS:
1.  Successful AFib ablation with wide circumferential ablation of the pulmonary
veins.
2.  Successful creation of a posterior roofline.
 
 
 
 
 
 
 
 
 
 
 
 
 
 
 
  <ELECTRONICALLY SIGNED>
   By: Sundeep Boswell MD        
  05/28/20     1613
D: 05/22/20 1233                           _____________________________________
T: 05/22/20 1848                           Sundeep Boswell MD          /nt

## 2020-06-18 ENCOUNTER — HOSPITAL ENCOUNTER (OUTPATIENT)
Dept: HOSPITAL 35 - SJCVC | Age: 65
End: 2020-06-18
Attending: INTERNAL MEDICINE
Payer: COMMERCIAL

## 2020-06-18 DIAGNOSIS — I71.4: ICD-10-CM

## 2020-06-18 DIAGNOSIS — E78.5: ICD-10-CM

## 2020-06-18 DIAGNOSIS — R00.1: Primary | ICD-10-CM

## 2020-06-18 DIAGNOSIS — I10: ICD-10-CM

## 2020-06-18 DIAGNOSIS — I48.0: ICD-10-CM

## 2020-06-18 DIAGNOSIS — I65.23: ICD-10-CM

## 2020-06-18 DIAGNOSIS — I25.10: ICD-10-CM

## 2020-06-22 ENCOUNTER — HOSPITAL ENCOUNTER (EMERGENCY)
Dept: HOSPITAL 35 - ER | Age: 65
Discharge: HOME | End: 2020-06-22
Payer: COMMERCIAL

## 2020-06-22 VITALS — WEIGHT: 195 LBS | HEIGHT: 72 IN | BODY MASS INDEX: 26.41 KG/M2

## 2020-06-22 VITALS — SYSTOLIC BLOOD PRESSURE: 134 MMHG | DIASTOLIC BLOOD PRESSURE: 77 MMHG

## 2020-06-22 DIAGNOSIS — Z79.899: ICD-10-CM

## 2020-06-22 DIAGNOSIS — I25.10: ICD-10-CM

## 2020-06-22 DIAGNOSIS — R05: ICD-10-CM

## 2020-06-22 DIAGNOSIS — Z91.040: ICD-10-CM

## 2020-06-22 DIAGNOSIS — Z98.61: ICD-10-CM

## 2020-06-22 DIAGNOSIS — F41.0: ICD-10-CM

## 2020-06-22 DIAGNOSIS — R51: ICD-10-CM

## 2020-06-22 DIAGNOSIS — K59.00: ICD-10-CM

## 2020-06-22 DIAGNOSIS — R42: ICD-10-CM

## 2020-06-22 DIAGNOSIS — I10: ICD-10-CM

## 2020-06-22 DIAGNOSIS — R25.1: ICD-10-CM

## 2020-06-22 DIAGNOSIS — R20.2: ICD-10-CM

## 2020-06-22 DIAGNOSIS — R06.02: Primary | ICD-10-CM

## 2020-06-22 DIAGNOSIS — Z87.891: ICD-10-CM

## 2020-06-22 DIAGNOSIS — E78.5: ICD-10-CM

## 2020-06-22 LAB
ANION GAP SERPL CALC-SCNC: 6 MMOL/L (ref 7–16)
BASOPHILS NFR BLD AUTO: 0.7 % (ref 0–2)
BILIRUB UR-MCNC: NEGATIVE MG/DL
BUN SERPL-MCNC: 11 MG/DL (ref 7–18)
CALCIUM SERPL-MCNC: 8.8 MG/DL (ref 8.5–10.1)
CHLORIDE SERPL-SCNC: 97 MMOL/L (ref 98–107)
CO2 SERPL-SCNC: 29 MMOL/L (ref 21–32)
COLOR UR: YELLOW
CREAT SERPL-MCNC: 1 MG/DL (ref 0.7–1.3)
EOSINOPHIL NFR BLD: 1 % (ref 0–3)
ERYTHROCYTE [DISTWIDTH] IN BLOOD BY AUTOMATED COUNT: 13.3 % (ref 10.5–14.5)
GLUCOSE SERPL-MCNC: 117 MG/DL (ref 74–106)
GRANULOCYTES NFR BLD MANUAL: 71.8 % (ref 36–66)
HCT VFR BLD CALC: 43.2 % (ref 42–52)
HGB BLD-MCNC: 15 GM/DL (ref 14–18)
KETONES UR STRIP-MCNC: NEGATIVE MG/DL
LYMPHOCYTES NFR BLD AUTO: 19.3 % (ref 24–44)
MCH RBC QN AUTO: 32.7 PG (ref 26–34)
MCHC RBC AUTO-ENTMCNC: 34.7 G/DL (ref 28–37)
MCV RBC: 94.3 FL (ref 80–100)
MONOCYTES NFR BLD: 7.2 % (ref 1–8)
NEUTROPHILS # BLD: 5.6 THOU/UL (ref 1.4–8.2)
PLATELET # BLD: 192 THOU/UL (ref 150–400)
POTASSIUM SERPL-SCNC: 3.7 MMOL/L (ref 3.5–5.1)
RBC # BLD AUTO: 4.58 MIL/UL (ref 4.5–6)
RBC # UR STRIP: NEGATIVE /UL
SODIUM SERPL-SCNC: 132 MMOL/L (ref 136–145)
SP GR UR STRIP: 1.01 (ref 1–1.03)
TROPONIN I SERPL-MCNC: <0.06 NG/ML (ref ?–0.06)
URINE CLARITY: CLEAR
URINE GLUCOSE-RANDOM*: NEGATIVE
URINE LEUKOCYTES-REFLEX: NEGATIVE
URINE NITRITE-REFLEX: NEGATIVE
URINE PROTEIN (DIPSTICK): NEGATIVE
UROBILINOGEN UR STRIP-ACNC: 0.2 E.U./DL (ref 0.2–1)
WBC # BLD AUTO: 7.8 THOU/UL (ref 4–11)

## 2020-06-23 NOTE — EKG
[facility]
[address]
[place]   [address]                     ELECTROCARDIOGRAM REPORT      
_______________________________________________________________________________
 
Name:       CHATA RAHMAN              Room #:                     DEP [facility]#:      [id number]                       Account #:      [id number]  
Admission:  20    Attend Phys:                          
Discharge:  20    Date of Birth:  [birth year]  
                                                          Report #: [id number]
                                                                    [id number]
_______________________________________________________________________________
THIS REPORT FOR:  
 
cc:  ANUSHKA Cotter family physician/PCP 
     ANUSHKA - Cyndee family physician/PCP 
     Elvin Shi MD [facility]
THIS REPORT FOR:   //name//                          
 
                         [facility] ED
                                       
Test Date:    2020               Test Time:    17:47:03
Pat Name:     CHATA RAHMAN           Department:   
Patient ID:   [id number]            Room:          
Gender:                               Technician:   
:          1955               Requested By: Osvaldo Finch
Order Number: [id number] MD:   Elvin Shi
                                 Measurements
Intervals                              Axis          
Rate:         65                       P:            44
MD:           179                      QRS:          13
QRSD:         101                      T:            35
QT:           399                                    
QTc:          415                                    
                           Interpretive Statements
Sinus rhythm
Abnormal R-wave progression, early transition
Baseline wander in lead(s) V6
Compared to ECG 2020 14:08:12
Heart rate has slowed
Electronically Signed On 2020 8:04:43 CDT by Elvin Shi
[email or web address]
 
 
 
 
 
 
 
 
 
 
 
 
 
 
  <ELECTRONICALLY SIGNED>
   By: Elvin Shi MD, [facility]   
  20     0804
D: 20 1747                           _____________________________________
T: 20 174                           Elvin Sih MD, [facility]     /EPI

## 2020-06-25 ENCOUNTER — HOSPITAL ENCOUNTER (OUTPATIENT)
Dept: HOSPITAL 35 - CAT | Age: 65
End: 2020-06-25
Attending: INTERNAL MEDICINE
Payer: COMMERCIAL

## 2020-06-25 DIAGNOSIS — I70.0: ICD-10-CM

## 2020-06-25 DIAGNOSIS — J84.10: ICD-10-CM

## 2020-06-25 DIAGNOSIS — J98.4: ICD-10-CM

## 2020-06-25 DIAGNOSIS — J43.9: Primary | ICD-10-CM

## 2020-06-25 DIAGNOSIS — I25.10: ICD-10-CM

## 2020-06-25 DIAGNOSIS — R91.1: ICD-10-CM

## 2020-06-25 DIAGNOSIS — K76.89: ICD-10-CM

## 2020-08-07 ENCOUNTER — HOSPITAL ENCOUNTER (OUTPATIENT)
Dept: HOSPITAL 35 - SJCVC | Age: 65
End: 2020-08-07
Attending: INTERNAL MEDICINE
Payer: COMMERCIAL

## 2020-08-07 DIAGNOSIS — I65.23: ICD-10-CM

## 2020-08-07 DIAGNOSIS — R00.1: Primary | ICD-10-CM

## 2020-08-07 DIAGNOSIS — I25.10: ICD-10-CM

## 2020-08-07 DIAGNOSIS — E78.5: ICD-10-CM

## 2020-08-07 DIAGNOSIS — Z82.49: ICD-10-CM

## 2020-08-07 DIAGNOSIS — Z79.899: ICD-10-CM

## 2020-08-07 DIAGNOSIS — Z95.828: ICD-10-CM

## 2020-08-07 DIAGNOSIS — Z87.891: ICD-10-CM

## 2020-08-14 ENCOUNTER — HOSPITAL ENCOUNTER (OUTPATIENT)
Dept: HOSPITAL 35 - SJCVCIMAG | Age: 65
End: 2020-08-14
Attending: INTERNAL MEDICINE
Payer: COMMERCIAL

## 2020-08-14 DIAGNOSIS — E04.1: ICD-10-CM

## 2020-08-14 DIAGNOSIS — R22.1: ICD-10-CM

## 2020-08-14 DIAGNOSIS — I65.23: Primary | ICD-10-CM

## 2020-08-26 ENCOUNTER — HOSPITAL ENCOUNTER (OUTPATIENT)
Dept: HOSPITAL 35 - SJCVC | Age: 65
End: 2020-08-26
Attending: INTERNAL MEDICINE
Payer: COMMERCIAL

## 2020-08-26 DIAGNOSIS — Z87.891: ICD-10-CM

## 2020-08-26 DIAGNOSIS — I48.0: Primary | ICD-10-CM

## 2020-08-26 DIAGNOSIS — I25.10: ICD-10-CM

## 2020-08-26 DIAGNOSIS — I48.3: ICD-10-CM

## 2020-08-26 DIAGNOSIS — Z79.899: ICD-10-CM

## 2020-12-03 ENCOUNTER — HOSPITAL ENCOUNTER (OUTPATIENT)
Dept: HOSPITAL 35 - SJCVC | Age: 65
End: 2020-12-03
Attending: INTERNAL MEDICINE
Payer: COMMERCIAL

## 2020-12-03 DIAGNOSIS — I48.0: Primary | ICD-10-CM

## 2020-12-03 DIAGNOSIS — R91.1: ICD-10-CM

## 2020-12-03 DIAGNOSIS — Z79.899: ICD-10-CM

## 2020-12-03 DIAGNOSIS — I25.10: ICD-10-CM

## 2020-12-03 DIAGNOSIS — Z87.891: ICD-10-CM

## 2020-12-03 DIAGNOSIS — I48.3: ICD-10-CM

## 2020-12-03 DIAGNOSIS — Z95.828: ICD-10-CM

## 2021-02-05 ENCOUNTER — HOSPITAL ENCOUNTER (OUTPATIENT)
Dept: HOSPITAL 35 - SJCVC | Age: 66
End: 2021-02-05
Attending: INTERNAL MEDICINE
Payer: COMMERCIAL

## 2021-02-05 DIAGNOSIS — C44.42: ICD-10-CM

## 2021-02-05 DIAGNOSIS — Z91.040: ICD-10-CM

## 2021-02-05 DIAGNOSIS — Z87.891: ICD-10-CM

## 2021-02-05 DIAGNOSIS — Z86.79: ICD-10-CM

## 2021-02-05 DIAGNOSIS — Z79.899: ICD-10-CM

## 2021-02-05 DIAGNOSIS — I65.23: ICD-10-CM

## 2021-02-05 DIAGNOSIS — E78.5: ICD-10-CM

## 2021-02-05 DIAGNOSIS — Z98.890: ICD-10-CM

## 2021-02-05 DIAGNOSIS — I73.9: ICD-10-CM

## 2021-02-05 DIAGNOSIS — E78.00: ICD-10-CM

## 2021-02-05 DIAGNOSIS — I25.10: Primary | ICD-10-CM

## 2021-08-05 ENCOUNTER — HOSPITAL ENCOUNTER (OUTPATIENT)
Dept: HOSPITAL 35 - SJCVCIMAG | Age: 66
End: 2021-08-05
Attending: INTERNAL MEDICINE
Payer: COMMERCIAL

## 2021-08-05 DIAGNOSIS — Z87.891: ICD-10-CM

## 2021-08-05 DIAGNOSIS — Z88.8: ICD-10-CM

## 2021-08-05 DIAGNOSIS — I25.10: ICD-10-CM

## 2021-08-05 DIAGNOSIS — I73.9: ICD-10-CM

## 2021-08-05 DIAGNOSIS — C44.42: ICD-10-CM

## 2021-08-05 DIAGNOSIS — Z79.899: ICD-10-CM

## 2021-08-05 DIAGNOSIS — I65.23: Primary | ICD-10-CM

## 2021-08-05 DIAGNOSIS — Z98.890: ICD-10-CM

## 2021-08-05 DIAGNOSIS — Z86.79: ICD-10-CM

## 2021-08-05 DIAGNOSIS — Z82.49: ICD-10-CM

## 2021-08-05 DIAGNOSIS — I10: ICD-10-CM

## 2021-08-05 DIAGNOSIS — E78.5: ICD-10-CM

## 2021-12-07 ENCOUNTER — HOSPITAL ENCOUNTER (OUTPATIENT)
Dept: HOSPITAL 35 - SJCVC | Age: 66
End: 2021-12-07
Attending: INTERNAL MEDICINE
Payer: COMMERCIAL

## 2021-12-07 DIAGNOSIS — Z87.891: ICD-10-CM

## 2021-12-07 DIAGNOSIS — Z88.8: ICD-10-CM

## 2021-12-07 DIAGNOSIS — I48.3: ICD-10-CM

## 2021-12-07 DIAGNOSIS — I25.10: ICD-10-CM

## 2021-12-07 DIAGNOSIS — Z79.899: ICD-10-CM

## 2021-12-07 DIAGNOSIS — E78.5: ICD-10-CM

## 2021-12-07 DIAGNOSIS — I48.0: Primary | ICD-10-CM

## 2022-02-07 ENCOUNTER — HOSPITAL ENCOUNTER (OUTPATIENT)
Dept: HOSPITAL 35 - SJCVC | Age: 67
End: 2022-02-07
Attending: INTERNAL MEDICINE
Payer: COMMERCIAL

## 2022-02-07 DIAGNOSIS — Z86.79: ICD-10-CM

## 2022-02-07 DIAGNOSIS — I73.9: ICD-10-CM

## 2022-02-07 DIAGNOSIS — Z79.899: ICD-10-CM

## 2022-02-07 DIAGNOSIS — F17.210: ICD-10-CM

## 2022-02-07 DIAGNOSIS — I65.23: ICD-10-CM

## 2022-02-07 DIAGNOSIS — I71.4: ICD-10-CM

## 2022-02-07 DIAGNOSIS — I25.10: ICD-10-CM

## 2022-02-07 DIAGNOSIS — E78.5: Primary | ICD-10-CM

## 2022-02-07 DIAGNOSIS — Z98.890: ICD-10-CM

## 2022-02-07 DIAGNOSIS — Z82.49: ICD-10-CM

## 2022-02-07 DIAGNOSIS — C44.42: ICD-10-CM

## 2022-02-07 DIAGNOSIS — R91.1: ICD-10-CM
